# Patient Record
Sex: FEMALE | Race: WHITE | NOT HISPANIC OR LATINO | Employment: OTHER | URBAN - METROPOLITAN AREA
[De-identification: names, ages, dates, MRNs, and addresses within clinical notes are randomized per-mention and may not be internally consistent; named-entity substitution may affect disease eponyms.]

---

## 2017-04-10 ENCOUNTER — TRANSCRIBE ORDERS (OUTPATIENT)
Dept: ADMINISTRATIVE | Facility: HOSPITAL | Age: 82
End: 2017-04-10

## 2017-04-10 DIAGNOSIS — M47.814 OSTEOARTHRITIS OF THORACIC SPINE, UNSPECIFIED SPINAL OSTEOARTHRITIS COMPLICATION STATUS: Primary | ICD-10-CM

## 2017-04-12 ENCOUNTER — HOSPITAL ENCOUNTER (OUTPATIENT)
Dept: GENERAL RADIOLOGY | Facility: HOSPITAL | Age: 82
Discharge: HOME | End: 2017-04-12
Admitting: INTERNAL MEDICINE

## 2017-04-12 ENCOUNTER — HOSPITAL ENCOUNTER (OUTPATIENT)
Dept: NUCLEAR MEDICINE | Facility: HOSPITAL | Age: 82
Discharge: HOME | End: 2017-04-12

## 2017-04-12 ENCOUNTER — HOSPITAL ENCOUNTER (OUTPATIENT)
Dept: GENERAL RADIOLOGY | Facility: HOSPITAL | Age: 82
Discharge: HOME | End: 2017-04-12

## 2017-04-12 DIAGNOSIS — R52 PAIN: ICD-10-CM

## 2017-04-12 DIAGNOSIS — M47.814 OSTEOARTHRITIS OF THORACIC SPINE, UNSPECIFIED SPINAL OSTEOARTHRITIS COMPLICATION STATUS: ICD-10-CM

## 2017-04-12 PROCEDURE — 78306 BONE IMAGING WHOLE BODY: CPT

## 2017-04-12 PROCEDURE — 0 TECHNETIUM MEDRONATE KIT: Performed by: INTERNAL MEDICINE

## 2017-04-12 PROCEDURE — 72110 X-RAY EXAM L-2 SPINE 4/>VWS: CPT

## 2017-04-12 PROCEDURE — A9503 TC99M MEDRONATE: HCPCS | Performed by: INTERNAL MEDICINE

## 2017-04-12 PROCEDURE — 73630 X-RAY EXAM OF FOOT: CPT

## 2017-04-12 RX ORDER — TC 99M MEDRONATE 20 MG/10ML
23 INJECTION, POWDER, LYOPHILIZED, FOR SOLUTION INTRAVENOUS
Status: COMPLETED | OUTPATIENT
Start: 2017-04-12 | End: 2017-04-12

## 2017-04-12 RX ADMIN — Medication 23 MILLICURIE: at 11:30

## 2017-04-24 ENCOUNTER — TRANSCRIBE ORDERS (OUTPATIENT)
Dept: ADMINISTRATIVE | Facility: HOSPITAL | Age: 82
End: 2017-04-24

## 2017-04-24 DIAGNOSIS — R94.02 ABNORMAL PET SCAN OF HEAD: Primary | ICD-10-CM

## 2017-04-28 ENCOUNTER — HOSPITAL ENCOUNTER (OUTPATIENT)
Dept: CT IMAGING | Facility: HOSPITAL | Age: 82
Discharge: HOME | End: 2017-04-28
Admitting: INTERNAL MEDICINE

## 2017-04-28 DIAGNOSIS — R94.02 ABNORMAL PET SCAN OF HEAD: ICD-10-CM

## 2017-04-28 PROCEDURE — 70450 CT HEAD/BRAIN W/O DYE: CPT

## 2019-04-17 ENCOUNTER — TELEPHONE (OUTPATIENT)
Dept: ORTHOPEDIC SURGERY | Facility: CLINIC | Age: 84
End: 2019-04-17

## 2019-04-17 NOTE — TELEPHONE ENCOUNTER
Patient says that she has reinjured her left leg and has been having swelling. Patient says that she last saw [facility] in 2011 for same isuue. Please Advise

## 2019-04-19 NOTE — TELEPHONE ENCOUNTER
Spoke with patient and she has swelling in her left ankle. Patient stated that she lost her balance and came down on her ankle wrong. Patient said this happened three weeks ago. Patient is able to bear weight and has been using her walker to get around along with ace wraps on the ankle. Should patient come in for an xray and to get checked out?

## 2019-04-24 ENCOUNTER — OFFICE VISIT (OUTPATIENT)
Dept: ORTHOPEDIC SURGERY | Facility: CLINIC | Age: 84
End: 2019-04-24

## 2019-04-24 VITALS — HEIGHT: 62 IN | TEMPERATURE: 98 F | BODY MASS INDEX: 28.52 KG/M2 | WEIGHT: 155 LBS

## 2019-04-24 DIAGNOSIS — S86.302A INJURY OF PERONEAL TENDON OF LEFT FOOT, INITIAL ENCOUNTER: ICD-10-CM

## 2019-04-24 DIAGNOSIS — S99.912A LEFT ANKLE INJURY, INITIAL ENCOUNTER: Primary | ICD-10-CM

## 2019-04-24 DIAGNOSIS — M25.472 LEFT ANKLE SWELLING: ICD-10-CM

## 2019-04-24 PROCEDURE — 73610 X-RAY EXAM OF ANKLE: CPT | Performed by: ORTHOPAEDIC SURGERY

## 2019-04-24 PROCEDURE — 99204 OFFICE O/P NEW MOD 45 MIN: CPT | Performed by: ORTHOPAEDIC SURGERY

## 2019-04-24 RX ORDER — CELECOXIB 200 MG/1
CAPSULE ORAL DAILY
COMMUNITY
Start: 2013-04-29

## 2019-04-24 RX ORDER — ALBUTEROL SULFATE 90 UG/1
AEROSOL, METERED RESPIRATORY (INHALATION)
COMMUNITY
Start: 2013-06-27

## 2019-04-24 RX ORDER — AMITRIPTYLINE HYDROCHLORIDE 50 MG/1
50 TABLET, FILM COATED ORAL DAILY
COMMUNITY
Start: 2019-01-15

## 2019-04-24 RX ORDER — RANITIDINE 150 MG/1
150 TABLET ORAL 2 TIMES DAILY
Refills: 0 | COMMUNITY
Start: 2019-02-06

## 2019-04-24 NOTE — PROGRESS NOTES
New Patient Complaint      Patient: Cyndi Salvador  YOB: 1935 83 y.o. female  Medical Record Number: [id number]    Chief Complaints: I hurt my ankle    History of Present Illness: Patient injured her left ankle on 4/10/2019 when she fell in her bathroom.  She had persisting complaints of moderate constant aching pain with redness and swelling mainly over the lateral aspect of the left ankle more so than medially worse with standing and walking improved with anti-inflammatories ice and rest.    She has started using a walker which she had not been doing previously but does occasionally use a cane.    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 Blunt for this after our last visit in 2013 with recommendations for observation of this.              HPI    Allergies:   Allergies   Allergen Reactions   • Transdermal Base Other (See Comments)     Deathly ill       Medications:   Current Outpatient Medications on File Prior to Visit   Medication Sig   • Acetaminophen (TYLENOL) 325 MG capsule Take 500 mg by mouth 4 (Four) Times a Day.   • albuterol sulfate HFA (PROVENTIL HFA) 108 (90 Base) MCG/ACT inhaler Proventil  (90 Base) MCG/ACT Inhalation Aerosol Solution; Patient Sig: Proventil  (90 Base) MCG/ACT Inhalation Aerosol Solution INHALE 1 TO 2 PUFFS EVERY 4 TO 6 HOURS AS NEEDED.; 1; 3; 27-Jun-2013; Active   • amitriptyline (ELAVIL) 50 MG tablet Take 50 mg by mouth Daily.   • celecoxib (CELEBREX) 200 MG capsule Take  by mouth Daily.   • loratadine-pseudoephedrine (CLARITIN-D 24 HOUR)  MG per 24 hr tablet Take  by mouth.   • raNITIdine (ZANTAC) 150 MG tablet Take 150 mg by mouth 2 (Two) Times a Day.     No current facility-administered medications on file prior to visit.        Past Medical History:   Diagnosis Date   • Asthma    • COPD (chronic obstructive pulmonary disease) (CMS/[facility])    • Depression with  "anxiety    • Osteoporosis    • Rheumatoid arthritis (CMS/[facility])      Past Surgical History:   Procedure Laterality Date   • CHOLECYSTECTOMY       Social History     Occupational History   • Not on file   Tobacco Use   • Smoking status: Never Smoker   • Smokeless tobacco: Never Used   Substance and Sexual Activity   • Alcohol use: No     Frequency: Never   • Drug use: No   • Sexual activity: Defer      Social History     Social History Narrative   • Not on file     Family History   Problem Relation Age of Onset   • Hypertension Mother    • Cancer Brother         testicular       Review of Systems: 14 point review of systems performed, positive pertinent findings identified in HPI. All remaining systems negative except numbness and tingling, joint pain, stiffness, muscle pain    Review of Systems      Physical Exam:   Vitals:    04/24/19 1041   Temp: 98 °F (36.7 °C)   TempSrc: Temporal   Weight: 70.3 kg (155 lb)   Height: 157.5 cm (62\")     Physical Exam   Constitutional: pleasant, well developed   Eyes: sclera non icteric  Hearing : adequate for exam  Cardiovascular: palpable pulses in left foot, left calf/ thigh NT without sign of DVT  Respiratoy: breathing unlabored   Neurological: grossly sensate to LT throughout left LE  Psychiatric: oriented with normal mood and affect.   Lymphatic: No palpable popliteal lymphadenopathy left LE  Skin: intact throughout left leg/foot  Musculoskeletal: Left ankle shows mild to moderate ecchymosis with discomfort over the distal aspect of the fibula and along the peroneal tendons and minimal discomfort medially.  No exacerbation of pain with proximal fibular compression and no tenderness over the dorsum of the midfoot.  Physical Exam  Ortho Exam    Radiology: 3 views of the left ankle ordered to evaluate pain reviewed and compared with prior x-rays.  The fibular stress fracture that was previously evident about 7 cm proximal to the tip of the fibula appears to remain healed and " without change in alignment compared with previous x-rays there does appear to be some slight spurring over the tip of the fibula that may be slightly more prominent compared with previous x-rays also a small new ossification just lateral to the distal aspect of the fibula that may be a avulsion.  There is some questionable new area over the medial aspect of the distal tibia that may be some cortical buckling    Assessment/Plan: 1.  Left ankle injury with possible anterolateral ligamentous sprain or recurrent fibula fracture  2.  Left ankle possible peroneal tendon injury  3.  Left proximal lower leg lipoma    The ankle is the most pressing issue at this point and she will use a boot and do partial weightbearing only with a walker but she does not seem to sleepiness.  We are going to get an MRI of her left ankle for further evaluation for occult fracture or tendon or ligament injury to help tailor treatment accordingly.    She understands to call to schedule follow-up appointment if MRI has been scheduled in order to review results in the office.

## 2019-05-05 ENCOUNTER — HOSPITAL ENCOUNTER (OUTPATIENT)
Dept: MRI IMAGING | Facility: HOSPITAL | Age: 84
Discharge: HOME | End: 2019-05-05
Admitting: ORTHOPAEDIC SURGERY

## 2019-05-05 DIAGNOSIS — S99.912A LEFT ANKLE INJURY, INITIAL ENCOUNTER: ICD-10-CM

## 2019-05-05 DIAGNOSIS — S86.302A INJURY OF PERONEAL TENDON OF LEFT FOOT, INITIAL ENCOUNTER: ICD-10-CM

## 2019-05-05 PROCEDURE — 73721 MRI JNT OF LWR EXTRE W/O DYE: CPT

## 2019-05-20 ENCOUNTER — OFFICE VISIT (OUTPATIENT)
Dept: ORTHOPEDIC SURGERY | Facility: CLINIC | Age: 84
End: 2019-05-20

## 2019-05-20 ENCOUNTER — HOSPITAL ENCOUNTER (OUTPATIENT)
Dept: CARDIOLOGY | Facility: HOSPITAL | Age: 84
Discharge: HOME | End: 2019-05-20
Admitting: ORTHOPAEDIC SURGERY

## 2019-05-20 VITALS — BODY MASS INDEX: 28.52 KG/M2 | WEIGHT: 155 LBS | HEIGHT: 62 IN | TEMPERATURE: 97.8 F

## 2019-05-20 DIAGNOSIS — M19.079 ARTHRITIS OF FOOT: ICD-10-CM

## 2019-05-20 DIAGNOSIS — S93.402D SPRAIN OF LEFT ANKLE, UNSPECIFIED LIGAMENT, SUBSEQUENT ENCOUNTER: ICD-10-CM

## 2019-05-20 DIAGNOSIS — M79.662 PAIN OF LEFT CALF: Primary | ICD-10-CM

## 2019-05-20 DIAGNOSIS — M79.662 PAIN OF LEFT CALF: ICD-10-CM

## 2019-05-20 DIAGNOSIS — M76.822 POSTERIOR TIBIAL TENDONITIS, LEFT: ICD-10-CM

## 2019-05-20 DIAGNOSIS — S86.312D PERONEAL TENDON TEAR, LEFT, SUBSEQUENT ENCOUNTER: ICD-10-CM

## 2019-05-20 PROBLEM — S93.402A SPRAIN OF LEFT ANKLE: Status: ACTIVE | Noted: 2019-05-20

## 2019-05-20 LAB
BH CV LOW VAS LEFT POPLITEAL SPONT: 1
BH CV LOWER VASCULAR LEFT COMMON FEMORAL AUGMENT: NORMAL
BH CV LOWER VASCULAR LEFT COMMON FEMORAL COMPETENT: NORMAL
BH CV LOWER VASCULAR LEFT COMMON FEMORAL COMPRESS: NORMAL
BH CV LOWER VASCULAR LEFT COMMON FEMORAL PHASIC: NORMAL
BH CV LOWER VASCULAR LEFT COMMON FEMORAL SPONT: NORMAL
BH CV LOWER VASCULAR LEFT DISTAL FEMORAL COMPRESS: NORMAL
BH CV LOWER VASCULAR LEFT GASTRONEMIUS COMPRESS: NORMAL
BH CV LOWER VASCULAR LEFT GREATER SAPH AK COMPRESS: NORMAL
BH CV LOWER VASCULAR LEFT GREATER SAPH BK COMPRESS: NORMAL
BH CV LOWER VASCULAR LEFT MID FEMORAL AUGMENT: NORMAL
BH CV LOWER VASCULAR LEFT MID FEMORAL COMPETENT: NORMAL
BH CV LOWER VASCULAR LEFT MID FEMORAL COMPRESS: NORMAL
BH CV LOWER VASCULAR LEFT MID FEMORAL PHASIC: NORMAL
BH CV LOWER VASCULAR LEFT MID FEMORAL SPONT: NORMAL
BH CV LOWER VASCULAR LEFT PERONEAL COMPRESS: NORMAL
BH CV LOWER VASCULAR LEFT POPLITEAL AUGMENT: NORMAL
BH CV LOWER VASCULAR LEFT POPLITEAL COMPETENT: NORMAL
BH CV LOWER VASCULAR LEFT POPLITEAL COMPRESS: NORMAL
BH CV LOWER VASCULAR LEFT POPLITEAL PHASIC: NORMAL
BH CV LOWER VASCULAR LEFT POPLITEAL SPONT: NORMAL
BH CV LOWER VASCULAR LEFT POSTERIOR TIBIAL COMPRESS: NORMAL
BH CV LOWER VASCULAR LEFT PROXIMAL FEMORAL COMPRESS: NORMAL
BH CV LOWER VASCULAR LEFT SAPHENOFEMORAL JUNCTION AUGMENT: NORMAL
BH CV LOWER VASCULAR LEFT SAPHENOFEMORAL JUNCTION COMPETENT: NORMAL
BH CV LOWER VASCULAR LEFT SAPHENOFEMORAL JUNCTION COMPRESS: NORMAL
BH CV LOWER VASCULAR LEFT SAPHENOFEMORAL JUNCTION PHASIC: NORMAL
BH CV LOWER VASCULAR LEFT SAPHENOFEMORAL JUNCTION SPONT: NORMAL
BH CV LOWER VASCULAR RIGHT COMMON FEMORAL AUGMENT: NORMAL
BH CV LOWER VASCULAR RIGHT COMMON FEMORAL COMPETENT: NORMAL
BH CV LOWER VASCULAR RIGHT COMMON FEMORAL COMPRESS: NORMAL
BH CV LOWER VASCULAR RIGHT COMMON FEMORAL PHASIC: NORMAL
BH CV LOWER VASCULAR RIGHT COMMON FEMORAL SPONT: NORMAL
BH CV POP FLUID COLLECT LEFT: 1

## 2019-05-20 PROCEDURE — 93971 EXTREMITY STUDY: CPT

## 2019-05-20 PROCEDURE — 99213 OFFICE O/P EST LOW 20 MIN: CPT | Performed by: ORTHOPAEDIC SURGERY

## 2019-05-20 NOTE — PROGRESS NOTES
"Ankle Follow Up      Patient: Cyndi Salvador    YOB: 1935 83 y.o. female    Chief Complaints: Ankle pain    History of Present Illness: Patient was seen on 4/24/2019 after injuring her left ankle on 4/10/2019 when she fell in her bathroom.    She was instructed on using a boot that she had from a previous left fibular stress fracture in 2011 and use a walker.  She has not really been using the boot but has been occasionally using the walker and has persistent complaints of pain in the left ankle inferolaterally as well as discomfort of the dorsum of the midfoot.  Minimal discomfort medially    She complains of swelling in the leg that worsens throughout the day but improved some with elevation overnight.    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 Blunt for this after our last visit in 2013 with recommendations for observation of this          HPI    ROS: ankle pain  Past Medical History:   Diagnosis Date   • Asthma    • COPD (chronic obstructive pulmonary disease) (CMS/[facility])    • Depression with anxiety    • Osteoporosis    • Rheumatoid arthritis (CMS/[facility])        Physical Exam:   Vitals:    05/20/19 1336   Temp: 97.8 °F (36.6 °C)   TempSrc: Temporal   Weight: 70.3 kg (155 lb)   Height: 157.5 cm (62\")   PainSc:   9   PainLoc: Ankle     Well developed with normal mood.  She is with her daughter.  There is moderate discomfort on the inferolateral aspect of the left ankle worse with resisted eversion and mild discomfort over the anterolateral ankle ligaments but grossly stable anterior drawer.  Minimal discomfort inferomedially and mild discomfort over the dorsum of the midfoot.  There is mild discomfort along the calf but no focal venous cords.  There is mild swelling        Radiology: MRI films and report of the left hand foot dated 5/5/2019 reviewed which showed the peroneus brevis tendon is wrapped around the anterior " aspect of the longus with split configuration distal to the malleolus.  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      Assessment/Plan: 1.  Left peroneus brevis split tear wraparound anterior aspect of peroneus longus  2.  Left insertional posterior tendinopathy  3.  Left ATFL and CFL chronic thickening with old healed fracture of the distal fibular diametaphysis and thickening of the distal syndesmosis  4.  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    We will have her use compression hose for daytime use and she was sent to [facility] for this and will check a Doppler to make sure there is no blood clot    We will have her use her boot for protected weightbearing with her walker and she may remove this at rest for gentle range of motion exercises.    I will check her back in 1 month

## 2019-05-21 ENCOUNTER — TELEPHONE (OUTPATIENT)
Dept: ORTHOPEDIC SURGERY | Facility: CLINIC | Age: 84
End: 2019-05-21

## 2019-05-21 NOTE — TELEPHONE ENCOUNTER
----- Message from Matt Ray MD sent at 5/21/2019  9:31 AM EDT -----  Can you please call her and make sure that she got the compression hose and let her know that she should also see her PCP about the lymph node, thanks

## 2019-05-22 ENCOUNTER — TELEPHONE (OUTPATIENT)
Dept: ORTHOPEDIC SURGERY | Facility: CLINIC | Age: 84
End: 2019-05-22

## 2019-05-22 NOTE — TELEPHONE ENCOUNTER
Have spoken with the patient regarding her Doppler results.  She is negative for DVT although it does show some valvular incompetence in the left popliteal vein.  Patient also has a popliteal fossa fluid collection.  There was also an incidental finding of a left inguinal enlarged lymph node.  Dr. Ray is recommending that the patient wear her compression stockings during the day and off at night due to her venous insufficiency.  He is also wanting her to follow-up with her PCP regarding this enlarged lymph node.  Have faxed a copy of the Doppler report to her PCP and patient will contact her office to make an appointment for follow-up

## 2019-06-17 ENCOUNTER — OFFICE VISIT (OUTPATIENT)
Dept: ORTHOPEDIC SURGERY | Facility: CLINIC | Age: 84
End: 2019-06-17

## 2019-06-17 VITALS — WEIGHT: 156.6 LBS | TEMPERATURE: 97.9 F | BODY MASS INDEX: 28.82 KG/M2 | HEIGHT: 62 IN

## 2019-06-17 DIAGNOSIS — S86.312D PERONEAL TENDON TEAR, LEFT, SUBSEQUENT ENCOUNTER: Primary | ICD-10-CM

## 2019-06-17 DIAGNOSIS — S93.402D SPRAIN OF LEFT ANKLE, UNSPECIFIED LIGAMENT, SUBSEQUENT ENCOUNTER: ICD-10-CM

## 2019-06-17 DIAGNOSIS — M76.822 POSTERIOR TIBIAL TENDONITIS, LEFT: ICD-10-CM

## 2019-06-17 PROCEDURE — 99213 OFFICE O/P EST LOW 20 MIN: CPT | Performed by: ORTHOPAEDIC SURGERY

## 2019-06-18 NOTE — PROGRESS NOTES
"Ankle Follow Up      Patient: Cyndi Salvador    YOB: 1935 83 y.o. female    Chief Complaints: Ankle feeling better    History of Present Illness: Patient was seen on 4/24/2019 after injuring her left ankle on 4/10/2019 when she fell in her bathroom.  She was initially instructed on use of a boot that she had from previous stress fracture and walker and sent for MRI which was reviewed at our last visit on 5/29/2019.      She was last seen on 5/29/2019 instructed on continued use of her boot and protected weightbearing with a walker and gentle range of motion exercises.    She was also sent for a Doppler.    She states that the compression hose has helped significantly that her ankle feels much better.  She has weaned out of her boot and has been using a walker with no further complaints on the peroneal tendons and that the compression hose have helped significantly with only an occasional ache in the left ankle and no in the calf.    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 Blunt for this after our last visit in 2013 with recommendations for observation of this    HPI    ROS: ankle pain  Past Medical History:   Diagnosis Date   • Asthma    • COPD (chronic obstructive pulmonary disease) (CMS/[facility])    • Depression with anxiety    • Osteoporosis    • Rheumatoid arthritis (CMS/[facility])        Physical Exam:   Vitals:    06/17/19 1349   Temp: 97.9 °F (36.6 °C)   Weight: 71 kg (156 lb 9.6 oz)   Height: 157.5 cm (62\")     Well developed with normal mood.  She is with her daughter.  There was minimal discomfort on the inferolateral aspect of the left hindfoot minimal discomfort anterolaterally with grossly stable anterior drawer.  Minimal discomfort of the dorsum of the midfoot and no focal pain along the calf or superior aspect of the left lower leg.      Radiology: None performed    Doppler 5/20/2019 was reviewed which showed deep " venous valvular incompetence of the popliteal vessels with a popliteal fluid collection in the left inguinal lymph node      Assessment/Plan:  1.  Left peroneus brevis split tear wraparound anterior aspect of peroneus longus  2.  Left insertional posterior tendinopathy  3.  Left ATFL and CFL chronic thickening with old healed fracture of the distal fibular diametaphysis and thickening of the distal syndesmosis  4.  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    Recommend continued use of the walker and strengthening and stretching exercises of the left ankle if symptoms persist or worsen she will let me know.  She declined any formal therapy but has done some in the past and will resume home exercises.    Anything worsens to let me know otherwise I will see her back as needed